# Patient Record
Sex: MALE | Race: WHITE | NOT HISPANIC OR LATINO | Employment: OTHER | URBAN - METROPOLITAN AREA
[De-identification: names, ages, dates, MRNs, and addresses within clinical notes are randomized per-mention and may not be internally consistent; named-entity substitution may affect disease eponyms.]

---

## 2017-12-21 ENCOUNTER — NEW PATIENT COMPREHENSIVE (OUTPATIENT)
Dept: URBAN - METROPOLITAN AREA CLINIC 43 | Facility: CLINIC | Age: 67
End: 2017-12-21

## 2017-12-21 DIAGNOSIS — H25.813: ICD-10-CM

## 2017-12-21 DIAGNOSIS — H40.033: ICD-10-CM

## 2017-12-21 DIAGNOSIS — H43.813: ICD-10-CM

## 2017-12-21 PROCEDURE — G8428 CUR MEDS NOT DOCUMENT: HCPCS

## 2017-12-21 PROCEDURE — 92004 COMPRE OPH EXAM NEW PT 1/>: CPT

## 2017-12-21 PROCEDURE — 92020 GONIOSCOPY: CPT

## 2017-12-21 PROCEDURE — 92015 DETERMINE REFRACTIVE STATE: CPT

## 2017-12-21 ASSESSMENT — VISUAL ACUITY
OD_CC: 20/30-2
OD_SC: 20/40-2
OD_CC: J2
OD_SC: J8
OS_SC: J8
OS_SC: 20/30
OS_CC: 20/30+3
OS_CC: J1

## 2017-12-21 ASSESSMENT — TONOMETRY
OD_IOP_MMHG: 17
OS_IOP_MMHG: 17

## 2019-01-29 NOTE — PATIENT DISCUSSION
DISCUSSED OPTION OF F/U WITH DR Kassie Nice OFFICE TO SEE IF LASIK OR A REFRACTIVE PROCEDURE MIGHT ELIMINATE THE NEED FOR READING GLASSES - HE WANTS TO STICK WITH READING GLASSES FOR NOW.

## 2019-01-29 NOTE — PROCEDURE NOTE: CLINICAL
PROCEDURE NOTE: Laser for Retinal Tear OD. Diagnosis: Retinal Hole. Prior to laser, risks/benefits/alternatives to laser discussed including loss of vision, decreased peripheral and night vision, need for more laser and/or surgery and patient wished to proceed. An informed consent was obtained and no assurances or guarantees were given. Spot size: 200 um. Pulse power: 400 mW. Pulse duration: 150 ms. Number of pulses: 57. Procedure Time: 11:22AM. Patient tolerated procedure well. There were no complications. Post-op instructions given. Patient given office phone number/answering service number and advised to call immediately should there be loss of vision or pain, or should they have any other questions or concerns. Afia Danielle

## 2019-03-21 NOTE — PATIENT DISCUSSION
DISCUSSED OPTION OF F/U WITH DR Naida Melara OFFICE TO SEE IF LASIK OR A REFRACTIVE PROCEDURE MIGHT ELIMINATE THE NEED FOR READING GLASSES - HE WANTS TO STICK WITH READING GLASSES FOR NOW.

## 2019-12-23 ENCOUNTER — ESTABLISHED COMPREHENSIVE EXAM (OUTPATIENT)
Dept: URBAN - METROPOLITAN AREA CLINIC 39 | Facility: CLINIC | Age: 69
End: 2019-12-23

## 2019-12-23 DIAGNOSIS — H43.813: ICD-10-CM

## 2019-12-23 DIAGNOSIS — H25.13: ICD-10-CM

## 2019-12-23 DIAGNOSIS — H40.033: ICD-10-CM

## 2019-12-23 PROCEDURE — 92015 DETERMINE REFRACTIVE STATE: CPT

## 2019-12-23 PROCEDURE — 92014 COMPRE OPH EXAM EST PT 1/>: CPT

## 2019-12-23 ASSESSMENT — VISUAL ACUITY
OD_CC: J1+
OU_SC: 20/25-1
OS_SC: 20/25-2
OU_CC: J1+
OS_CC: J1+
OD_SC: 20/30-1

## 2019-12-23 ASSESSMENT — TONOMETRY
OS_IOP_MMHG: 15
OD_IOP_MMHG: 14

## 2020-06-25 NOTE — PATIENT DISCUSSION
DISCUSSED OPTION OF F/U WITH DR Elle Sainz OFFICE TO SEE IF LASIK OR A REFRACTIVE PROCEDURE MIGHT ELIMINATE THE NEED FOR READING GLASSES - HE WANTS TO STICK WITH READING GLASSES FOR NOW.

## 2020-11-05 ENCOUNTER — RX CHANGE - NO APPT (OUTPATIENT)
Dept: URBAN - METROPOLITAN AREA CLINIC 39 | Facility: CLINIC | Age: 70
End: 2020-11-05

## 2020-12-09 NOTE — PATIENT DISCUSSION
Capsular haze appears visually significant, RECOMMEND CONSULT with DR Viktor Ragland for possible YAG capsulotomy.

## 2021-03-01 ENCOUNTER — ESTABLISHED COMPREHENSIVE EXAM (OUTPATIENT)
Dept: URBAN - METROPOLITAN AREA CLINIC 35 | Facility: CLINIC | Age: 71
End: 2021-03-01

## 2021-03-01 DIAGNOSIS — H40.033: ICD-10-CM

## 2021-03-01 DIAGNOSIS — H43.813: ICD-10-CM

## 2021-03-01 DIAGNOSIS — H52.4: ICD-10-CM

## 2021-03-01 DIAGNOSIS — H25.813: ICD-10-CM

## 2021-03-01 DIAGNOSIS — H25.13: ICD-10-CM

## 2021-03-01 PROCEDURE — 92014 COMPRE OPH EXAM EST PT 1/>: CPT

## 2021-03-01 PROCEDURE — 92015 DETERMINE REFRACTIVE STATE: CPT

## 2021-03-01 ASSESSMENT — VISUAL ACUITY
OU_CC: J1
OD_CC: J1
OD_SC: 20/25-2
OU_SC: 20/25+2
OS_SC: 20/25
OS_CC: J2

## 2021-03-01 ASSESSMENT — TONOMETRY
OS_IOP_MMHG: 16
OD_IOP_MMHG: 17

## 2021-03-29 ENCOUNTER — DILATED FUNDUS EXAM (OUTPATIENT)
Dept: URBAN - METROPOLITAN AREA CLINIC 35 | Facility: CLINIC | Age: 71
End: 2021-03-29

## 2021-03-29 DIAGNOSIS — H40.033: ICD-10-CM

## 2021-03-29 DIAGNOSIS — H25.812: ICD-10-CM

## 2021-03-29 DIAGNOSIS — H43.813: ICD-10-CM

## 2021-03-29 DIAGNOSIS — H25.811: ICD-10-CM

## 2021-03-29 PROCEDURE — 99211T TECH SERVICE

## 2021-03-29 ASSESSMENT — VISUAL ACUITY
OD_SC: 20/30
OU_SC: 20/20-2
OS_SC: 20/25

## 2021-06-09 NOTE — PATIENT DISCUSSION
DISCUSSED OPTION OF F/U WITH DR Kimberly Carcamo OFFICE TO SEE IF LASIK OR A REFRACTIVE PROCEDURE MIGHT ELIMINATE THE NEED FOR READING GLASSES - HE WANTS TO STICK WITH READING GLASSES FOR NOW.

## 2021-06-09 NOTE — PATIENT DISCUSSION
Capsular haze appears visually significant, RECOMMEND CONSULT with DR Fransisco Myers for possible YAG capsulotomy.

## 2022-07-18 NOTE — PATIENT DISCUSSION
DISCUSSED OPTION OF F/U WITH DR Da Dumont OFFICE TO SEE IF LASIK OR A REFRACTIVE PROCEDURE MIGHT ELIMINATE THE NEED FOR READING GLASSES - HE WANTS TO STICK WITH READING GLASSES FOR NOW.

## 2024-10-24 NOTE — PATIENT DISCUSSION
ARTIFICIAL TEARS to affected eye(s) as needed. Quality 137: Melanoma: Continuity Of Care - Recall System: Patient information entered into a recall system that includes: target date for the next exam specified AND a process to follow up with patients regarding missed or unscheduled appointments Detail Level: Detailed